# Patient Record
Sex: MALE | Race: BLACK OR AFRICAN AMERICAN | NOT HISPANIC OR LATINO | Employment: OTHER | URBAN - METROPOLITAN AREA
[De-identification: names, ages, dates, MRNs, and addresses within clinical notes are randomized per-mention and may not be internally consistent; named-entity substitution may affect disease eponyms.]

---

## 2023-08-29 PROBLEM — M25.561 CHRONIC PAIN OF BOTH KNEES: Status: ACTIVE | Noted: 2023-08-29

## 2023-08-29 PROBLEM — M25.562 CHRONIC PAIN OF BOTH KNEES: Status: ACTIVE | Noted: 2023-08-29

## 2023-08-29 PROBLEM — G89.29 CHRONIC PAIN OF BOTH KNEES: Status: ACTIVE | Noted: 2023-08-29

## 2023-08-29 PROBLEM — M17.0 DEGENERATIVE ARTHRITIS OF KNEE, BILATERAL: Status: ACTIVE | Noted: 2023-08-29

## 2023-08-29 RX ORDER — IBUPROFEN 800 MG/1
TABLET ORAL
COMMUNITY
Start: 2022-05-25

## 2023-08-29 RX ORDER — IBUPROFEN 200 MG
TABLET ORAL
COMMUNITY
End: 2023-10-31

## 2023-08-29 RX ORDER — SIMVASTATIN 40 MG/1
1 TABLET, FILM COATED ORAL NIGHTLY
COMMUNITY
Start: 2020-12-23

## 2023-08-29 RX ORDER — GLIPIZIDE 5 MG/1
1 TABLET ORAL
COMMUNITY
Start: 2021-05-20

## 2023-08-29 RX ORDER — LIDOCAINE 50 MG/G
1 PATCH TOPICAL DAILY
COMMUNITY

## 2023-08-29 RX ORDER — ASPIRIN 325 MG
50000 TABLET, DELAYED RELEASE (ENTERIC COATED) ORAL
COMMUNITY
Start: 2023-08-14 | End: 2023-10-31

## 2023-08-29 RX ORDER — DICLOFENAC SODIUM 50 MG/1
1 TABLET, DELAYED RELEASE ORAL 3 TIMES DAILY
COMMUNITY
Start: 2021-09-16

## 2023-08-29 RX ORDER — ATORVASTATIN CALCIUM 10 MG/1
1 TABLET, FILM COATED ORAL DAILY
COMMUNITY
Start: 2022-02-01

## 2023-08-29 RX ORDER — TRAMADOL HYDROCHLORIDE 50 MG/1
TABLET ORAL
COMMUNITY

## 2023-08-29 RX ORDER — LIDOCAINE 50 MG/G
PATCH TOPICAL
COMMUNITY
Start: 2021-01-22

## 2023-08-29 RX ORDER — METFORMIN HYDROCHLORIDE 500 MG/1
500 TABLET ORAL 2 TIMES DAILY
COMMUNITY
Start: 2023-05-10 | End: 2023-11-06

## 2023-08-29 RX ORDER — ALBUTEROL SULFATE 90 UG/1
AEROSOL, METERED RESPIRATORY (INHALATION)
COMMUNITY
Start: 2021-06-09

## 2023-08-29 RX ORDER — AMLODIPINE BESYLATE 10 MG/1
1 TABLET ORAL DAILY
COMMUNITY
Start: 2021-10-20

## 2023-08-29 RX ORDER — LOSARTAN POTASSIUM 100 MG/1
1 TABLET ORAL DAILY
COMMUNITY
Start: 2021-10-20

## 2023-08-29 RX ORDER — LIDOCAINE 50 MG/G
OINTMENT TOPICAL
COMMUNITY
Start: 2021-01-22

## 2023-10-20 ENCOUNTER — TRANSCRIBE ORDERS (OUTPATIENT)
Dept: INFUSION THERAPY | Facility: CLINIC | Age: 73
End: 2023-10-20
Payer: MEDICARE

## 2023-10-20 DIAGNOSIS — M25.562 BILATERAL CHRONIC KNEE PAIN: ICD-10-CM

## 2023-10-20 DIAGNOSIS — G89.29 BILATERAL CHRONIC KNEE PAIN: ICD-10-CM

## 2023-10-20 DIAGNOSIS — M25.561 BILATERAL CHRONIC KNEE PAIN: ICD-10-CM

## 2023-10-31 ENCOUNTER — HOSPITAL ENCOUNTER (OUTPATIENT)
Dept: PAIN MEDICINE | Facility: CLINIC | Age: 73
Discharge: HOME | End: 2023-10-31
Payer: MEDICARE

## 2023-10-31 ENCOUNTER — APPOINTMENT (OUTPATIENT)
Dept: PAIN MEDICINE | Facility: CLINIC | Age: 73
End: 2023-10-31
Payer: MEDICARE

## 2023-10-31 ENCOUNTER — ANCILLARY PROCEDURE (OUTPATIENT)
Dept: RADIOLOGY | Facility: CLINIC | Age: 73
End: 2023-10-31
Payer: MEDICARE

## 2023-10-31 VITALS
DIASTOLIC BLOOD PRESSURE: 78 MMHG | HEART RATE: 68 BPM | OXYGEN SATURATION: 96 % | RESPIRATION RATE: 16 BRPM | TEMPERATURE: 96.3 F | SYSTOLIC BLOOD PRESSURE: 139 MMHG

## 2023-10-31 DIAGNOSIS — M25.562 BILATERAL CHRONIC KNEE PAIN: ICD-10-CM

## 2023-10-31 DIAGNOSIS — M25.561 BILATERAL CHRONIC KNEE PAIN: ICD-10-CM

## 2023-10-31 DIAGNOSIS — G89.29 BILATERAL CHRONIC KNEE PAIN: ICD-10-CM

## 2023-10-31 PROCEDURE — 7100000010 HC PHASE TWO TIME - EACH INCREMENTAL 1 MINUTE

## 2023-10-31 PROCEDURE — 7100000009 HC PHASE TWO TIME - INITIAL BASE CHARGE

## 2023-10-31 PROCEDURE — 2500000005 HC RX 250 GENERAL PHARMACY W/O HCPCS

## 2023-10-31 PROCEDURE — 20611 DRAIN/INJ JOINT/BURSA W/US: CPT | Performed by: ANESTHESIOLOGY

## 2023-10-31 PROCEDURE — 77003 FLUOROGUIDE FOR SPINE INJECT: CPT

## 2023-10-31 PROCEDURE — 20611 DRAIN/INJ JOINT/BURSA W/US: CPT | Mod: 50 | Performed by: ANESTHESIOLOGY

## 2023-10-31 PROCEDURE — 2500000004 HC RX 250 GENERAL PHARMACY W/ HCPCS (ALT 636 FOR OP/ED): Mod: JZ | Performed by: ANESTHESIOLOGY

## 2023-10-31 RX ORDER — BUPIVACAINE HYDROCHLORIDE 7.5 MG/ML
INJECTION, SOLUTION EPIDURAL; RETROBULBAR
Status: COMPLETED
Start: 2023-10-31 | End: 2023-10-31

## 2023-10-31 RX ORDER — LIDOCAINE HYDROCHLORIDE 5 MG/ML
INJECTION, SOLUTION INFILTRATION; INTRAVENOUS
Status: COMPLETED
Start: 2023-10-31 | End: 2023-10-31

## 2023-10-31 RX ADMIN — BUPIVACAINE HYDROCHLORIDE 75 MG: 7.5 INJECTION, SOLUTION EPIDURAL; RETROBULBAR at 14:15

## 2023-10-31 RX ADMIN — LIDOCAINE HYDROCHLORIDE 250 MG: 5 INJECTION, SOLUTION INFILTRATION at 14:15

## 2023-10-31 RX ADMIN — TRIAMCINOLONE ACETONIDE EXTENDED-RELEASE INJECTABLE SUSPENSION 32 MG: KIT INTRA-ARTICULAR at 15:11

## 2023-10-31 RX ADMIN — TRIAMCINOLONE ACETONIDE EXTENDED-RELEASE INJECTABLE SUSPENSION 32 MG: KIT INTRA-ARTICULAR at 15:12

## 2023-10-31 ASSESSMENT — COLUMBIA-SUICIDE SEVERITY RATING SCALE - C-SSRS
1. IN THE PAST MONTH, HAVE YOU WISHED YOU WERE DEAD OR WISHED YOU COULD GO TO SLEEP AND NOT WAKE UP?: NO
6. HAVE YOU EVER DONE ANYTHING, STARTED TO DO ANYTHING, OR PREPARED TO DO ANYTHING TO END YOUR LIFE?: NO

## 2023-10-31 ASSESSMENT — ENCOUNTER SYMPTOMS
EYES NEGATIVE: 1
CARDIOVASCULAR NEGATIVE: 1
RESPIRATORY NEGATIVE: 1
GASTROINTESTINAL NEGATIVE: 1
HEMATOLOGIC/LYMPHATIC NEGATIVE: 1
ENDOCRINE NEGATIVE: 1
ARTHRALGIAS: 1
PSYCHIATRIC NEGATIVE: 1

## 2023-10-31 ASSESSMENT — PAIN - FUNCTIONAL ASSESSMENT: PAIN_FUNCTIONAL_ASSESSMENT: 0-10

## 2023-10-31 ASSESSMENT — PAIN SCALES - GENERAL: PAINLEVEL_OUTOF10: 8

## 2023-10-31 NOTE — Clinical Note
Prepped with ChloraPrep, a minimum of 3 minute dry time, longer if needed, no pooling noted, patient draped in sterile fashion. Bilateral knee

## 2023-10-31 NOTE — OP NOTE
Pre-op diagnosis: Knee arthritis  Postoperative diagnosis: The same    Procedure: bilateral Zilretta 32 mg,   injection under fluoroscopy guidance.  Blood loss: 0 mL  Complications: None    Ready to learn, no apparent learning barriers.  Explained treatment plan. Pt. verbalized understanding. Following review of potential side effects and complications (including but not necessarily limited to infection, allergic reaction, local tissue breakdown, injury to bodily tissues. The patient expressed understanding of this risk and wishes to proceed with the elective procedure.    The patient was brought to the operating room and placed in the supine position with pillows underneath the right knees..The procedure was carried out under sterile prep with Chlora-prep.  Under fluoroscopy guidance the skin was infiltrated with lidocaine 1% using 25 ga 1.5 inche  needle and size 18-gauge 1.5 inch needle was introduced and advanced into the lateral/caudal area of the patella into the ligament then the knee joint and after negative aspiration 0.5 cc of Omnipaque 300 was injected and showed excellent distribution of the dye into the joint cavity.  Next Zilretta 32 mg + 2 cc of 0.75 bupivacaine were injected into the joint.  The needle was flushed and removed.     The same procedure technique and medication dosages were repeated at  the left knee.    Patient tolerated the procedure very well and discharged to the recovery room in stable condition.

## 2023-10-31 NOTE — H&P
History Of Present Illness  Roly Cruz is a 73 y.o. male presenting with bilateral knee arthritis.  The patient has no changes in health medical management or allergy since his last visit on 9/19/2023.     Past Medical History  No past medical history on file.    Surgical History  No past surgical history on file.     Social History  He has no history on file for tobacco use, alcohol use, and drug use.    Family History  Family History   Family history unknown: Yes        Allergies  Patient has no known allergies.    Review of Systems   HENT: Negative.     Eyes: Negative.    Respiratory: Negative.     Cardiovascular: Negative.    Gastrointestinal: Negative.    Endocrine: Negative.    Genitourinary: Negative.    Musculoskeletal:  Positive for arthralgias.   Skin: Negative.    Hematological: Negative.    Psychiatric/Behavioral: Negative.          Physical Exam  Vitals and nursing note reviewed.   Constitutional:       Appearance: Normal appearance.   HENT:      Head: Normocephalic and atraumatic.      Nose: Nose normal.   Eyes:      Extraocular Movements: Extraocular movements intact.      Conjunctiva/sclera: Conjunctivae normal.      Pupils: Pupils are equal, round, and reactive to light.   Cardiovascular:      Rate and Rhythm: Normal rate and regular rhythm.      Pulses: Normal pulses.      Heart sounds: Normal heart sounds.   Pulmonary:      Effort: Pulmonary effort is normal.      Breath sounds: Normal breath sounds.   Abdominal:      General: Abdomen is flat. Bowel sounds are normal.      Palpations: Abdomen is soft.   Musculoskeletal:         General: Tenderness present.   Skin:     General: Skin is warm.   Neurological:      General: No focal deficit present.      Mental Status: He is alert and oriented to person, place, and time.   Psychiatric:         Mood and Affect: Mood normal.         Behavior: Behavior normal.          Last Recorded Vitals  Blood pressure 139/78, pulse 68, temperature 35.7 °C (96.3  °F), resp. rate 16, SpO2 96 %.    Relevant Results             Assessment/Plan   Active Problems:  There are no active Hospital Problems.      Plan for bilateral knee Zilretta injection           Deven Vines MD

## 2023-11-25 ENCOUNTER — HOSPITAL ENCOUNTER (EMERGENCY)
Facility: HOSPITAL | Age: 73
Discharge: HOME | End: 2023-11-25
Payer: MEDICARE

## 2023-11-25 ENCOUNTER — APPOINTMENT (OUTPATIENT)
Dept: RADIOLOGY | Facility: HOSPITAL | Age: 73
End: 2023-11-25
Payer: MEDICARE

## 2023-11-25 VITALS
BODY MASS INDEX: 36.53 KG/M2 | DIASTOLIC BLOOD PRESSURE: 83 MMHG | SYSTOLIC BLOOD PRESSURE: 129 MMHG | HEIGHT: 76 IN | HEART RATE: 73 BPM | TEMPERATURE: 98.6 F | OXYGEN SATURATION: 99 % | WEIGHT: 300 LBS | RESPIRATION RATE: 19 BRPM

## 2023-11-25 DIAGNOSIS — J11.1 FLU: Primary | ICD-10-CM

## 2023-11-25 LAB
ALBUMIN SERPL BCP-MCNC: 4 G/DL (ref 3.4–5)
ALP SERPL-CCNC: 48 U/L (ref 33–136)
ALT SERPL W P-5'-P-CCNC: 30 U/L (ref 10–52)
ANION GAP SERPL CALC-SCNC: 11 MMOL/L (ref 10–20)
AST SERPL W P-5'-P-CCNC: 30 U/L (ref 9–39)
BASOPHILS # BLD AUTO: 0.01 X10*3/UL (ref 0–0.1)
BASOPHILS NFR BLD AUTO: 0.2 %
BILIRUB SERPL-MCNC: 0.4 MG/DL (ref 0–1.2)
BNP SERPL-MCNC: 13 PG/ML (ref 0–99)
BUN SERPL-MCNC: 19 MG/DL (ref 6–23)
CALCIUM SERPL-MCNC: 8.7 MG/DL (ref 8.6–10.3)
CARDIAC TROPONIN I PNL SERPL HS: 18 NG/L (ref 0–20)
CHLORIDE SERPL-SCNC: 103 MMOL/L (ref 98–107)
CO2 SERPL-SCNC: 27 MMOL/L (ref 21–32)
CREAT SERPL-MCNC: 1.07 MG/DL (ref 0.5–1.3)
EOSINOPHIL # BLD AUTO: 0.09 X10*3/UL (ref 0–0.4)
EOSINOPHIL NFR BLD AUTO: 2 %
ERYTHROCYTE [DISTWIDTH] IN BLOOD BY AUTOMATED COUNT: 12.6 % (ref 11.5–14.5)
FLUAV RNA RESP QL NAA+PROBE: DETECTED
FLUBV RNA RESP QL NAA+PROBE: NOT DETECTED
GFR SERPL CREATININE-BSD FRML MDRD: 74 ML/MIN/1.73M*2
GLUCOSE SERPL-MCNC: 159 MG/DL (ref 74–99)
HCT VFR BLD AUTO: 42.4 % (ref 41–52)
HGB BLD-MCNC: 14.4 G/DL (ref 13.5–17.5)
IMM GRANULOCYTES # BLD AUTO: 0.01 X10*3/UL (ref 0–0.5)
IMM GRANULOCYTES NFR BLD AUTO: 0.2 % (ref 0–0.9)
INR PPP: 1.1 (ref 0.9–1.1)
LACTATE SERPL-SCNC: 0.9 MMOL/L (ref 0.4–2)
LYMPHOCYTES # BLD AUTO: 1.7 X10*3/UL (ref 0.8–3)
LYMPHOCYTES NFR BLD AUTO: 37.2 %
MAGNESIUM SERPL-MCNC: 1.93 MG/DL (ref 1.6–2.4)
MCH RBC QN AUTO: 30.1 PG (ref 26–34)
MCHC RBC AUTO-ENTMCNC: 34 G/DL (ref 32–36)
MCV RBC AUTO: 89 FL (ref 80–100)
MONOCYTES # BLD AUTO: 0.4 X10*3/UL (ref 0.05–0.8)
MONOCYTES NFR BLD AUTO: 8.8 %
NEUTROPHILS # BLD AUTO: 2.36 X10*3/UL (ref 1.6–5.5)
NEUTROPHILS NFR BLD AUTO: 51.6 %
NRBC BLD-RTO: 0 /100 WBCS (ref 0–0)
PLATELET # BLD AUTO: 143 X10*3/UL (ref 150–450)
POTASSIUM SERPL-SCNC: 3.5 MMOL/L (ref 3.5–5.3)
PROT SERPL-MCNC: 7 G/DL (ref 6.4–8.2)
PROTHROMBIN TIME: 11.9 SECONDS (ref 9.8–12.8)
RBC # BLD AUTO: 4.79 X10*6/UL (ref 4.5–5.9)
SARS-COV-2 RNA RESP QL NAA+PROBE: NOT DETECTED
SODIUM SERPL-SCNC: 137 MMOL/L (ref 136–145)
WBC # BLD AUTO: 4.6 X10*3/UL (ref 4.4–11.3)

## 2023-11-25 PROCEDURE — 85025 COMPLETE CBC W/AUTO DIFF WBC: CPT | Performed by: HEALTH CARE PROVIDER

## 2023-11-25 PROCEDURE — 85610 PROTHROMBIN TIME: CPT | Performed by: HEALTH CARE PROVIDER

## 2023-11-25 PROCEDURE — 96374 THER/PROPH/DIAG INJ IV PUSH: CPT

## 2023-11-25 PROCEDURE — 80053 COMPREHEN METABOLIC PANEL: CPT | Performed by: HEALTH CARE PROVIDER

## 2023-11-25 PROCEDURE — 99284 EMERGENCY DEPT VISIT MOD MDM: CPT | Mod: 25

## 2023-11-25 PROCEDURE — 87636 SARSCOV2 & INF A&B AMP PRB: CPT | Performed by: HEALTH CARE PROVIDER

## 2023-11-25 PROCEDURE — 84484 ASSAY OF TROPONIN QUANT: CPT | Performed by: HEALTH CARE PROVIDER

## 2023-11-25 PROCEDURE — 83880 ASSAY OF NATRIURETIC PEPTIDE: CPT | Performed by: HEALTH CARE PROVIDER

## 2023-11-25 PROCEDURE — 71045 X-RAY EXAM CHEST 1 VIEW: CPT | Performed by: RADIOLOGY

## 2023-11-25 PROCEDURE — 87635 SARS-COV-2 COVID-19 AMP PRB: CPT | Performed by: HEALTH CARE PROVIDER

## 2023-11-25 PROCEDURE — 2500000004 HC RX 250 GENERAL PHARMACY W/ HCPCS (ALT 636 FOR OP/ED): Performed by: HEALTH CARE PROVIDER

## 2023-11-25 PROCEDURE — 83605 ASSAY OF LACTIC ACID: CPT | Performed by: HEALTH CARE PROVIDER

## 2023-11-25 PROCEDURE — 36415 COLL VENOUS BLD VENIPUNCTURE: CPT | Performed by: HEALTH CARE PROVIDER

## 2023-11-25 PROCEDURE — 96361 HYDRATE IV INFUSION ADD-ON: CPT

## 2023-11-25 PROCEDURE — 83735 ASSAY OF MAGNESIUM: CPT | Performed by: HEALTH CARE PROVIDER

## 2023-11-25 PROCEDURE — 71045 X-RAY EXAM CHEST 1 VIEW: CPT

## 2023-11-25 RX ORDER — ONDANSETRON 4 MG/1
8 TABLET, ORALLY DISINTEGRATING ORAL EVERY 12 HOURS PRN
Qty: 8 TABLET | Refills: 0 | Status: SHIPPED | OUTPATIENT
Start: 2023-11-25 | End: 2023-11-27

## 2023-11-25 RX ORDER — ONDANSETRON HYDROCHLORIDE 2 MG/ML
4 INJECTION, SOLUTION INTRAVENOUS ONCE
Status: COMPLETED | OUTPATIENT
Start: 2023-11-25 | End: 2023-11-25

## 2023-11-25 RX ADMIN — ONDANSETRON 4 MG: 2 INJECTION INTRAMUSCULAR; INTRAVENOUS at 18:00

## 2023-11-25 RX ADMIN — SODIUM CHLORIDE, POTASSIUM CHLORIDE, SODIUM LACTATE AND CALCIUM CHLORIDE 500 ML: 600; 310; 30; 20 INJECTION, SOLUTION INTRAVENOUS at 18:00

## 2023-11-25 ASSESSMENT — LIFESTYLE VARIABLES
EVER HAD A DRINK FIRST THING IN THE MORNING TO STEADY YOUR NERVES TO GET RID OF A HANGOVER: NO
HAVE YOU EVER FELT YOU SHOULD CUT DOWN ON YOUR DRINKING: NO
EVER FELT BAD OR GUILTY ABOUT YOUR DRINKING: NO
HAVE PEOPLE ANNOYED YOU BY CRITICIZING YOUR DRINKING: NO

## 2023-11-25 ASSESSMENT — COLUMBIA-SUICIDE SEVERITY RATING SCALE - C-SSRS
1. IN THE PAST MONTH, HAVE YOU WISHED YOU WERE DEAD OR WISHED YOU COULD GO TO SLEEP AND NOT WAKE UP?: NO
1. IN THE PAST MONTH, HAVE YOU WISHED YOU WERE DEAD OR WISHED YOU COULD GO TO SLEEP AND NOT WAKE UP?: NO
6. HAVE YOU EVER DONE ANYTHING, STARTED TO DO ANYTHING, OR PREPARED TO DO ANYTHING TO END YOUR LIFE?: NO
2. HAVE YOU ACTUALLY HAD ANY THOUGHTS OF KILLING YOURSELF?: NO
6. HAVE YOU EVER DONE ANYTHING, STARTED TO DO ANYTHING, OR PREPARED TO DO ANYTHING TO END YOUR LIFE?: NO
2. HAVE YOU ACTUALLY HAD ANY THOUGHTS OF KILLING YOURSELF?: NO

## 2023-11-25 ASSESSMENT — PAIN - FUNCTIONAL ASSESSMENT: PAIN_FUNCTIONAL_ASSESSMENT: 0-10

## 2023-11-25 ASSESSMENT — PAIN SCALES - GENERAL: PAINLEVEL_OUTOF10: 6

## 2023-11-25 ASSESSMENT — PAIN DESCRIPTION - PROGRESSION: CLINICAL_PROGRESSION: NOT CHANGED

## 2023-11-25 NOTE — ED TRIAGE NOTES
Pt states cough, chest tightness, sore throat for one week. Pt with nausea, vomiting, and diarrhea. Pt states diffuse abdominal pain as well. Pt ambulatory in triage, states hx of HTN.

## 2023-11-25 NOTE — ED PROVIDER NOTES
HPI   Chief Complaint   Patient presents with   • Abdominal Pain   • Flu Symptoms       CC: Generalized flu symptoms, nausea vomiting diarrhea  HPI:   73-year-old male with history of non-insulin-dependent type 2 diabetes hypertension hyperlipidemia presents to ED complaining of nausea vomiting diarrhea for 3 to 4 days associated with some subjective fever, chills, generalized body aches, generalized weakness, denies having any hemoptysis, hematemesis, hematochezia or melanotic stools does not take any long-term anticoagulant or antiplatelet medications.  He denies any recent sick contacts.    Additional Limitations to History:   External Records Reviewed: I reviewed recent and relevant outside records including   History Obtained From:     Past Medical History: Per HPI  Medications: Reviewed in EMR and with patient  Allergies:  Reviewed in EMR  Past Surgical History:   Social History:     ------------------------------------------------------------------------------------------------------  Physical Exam:  --Vital signs reviewed in nursing triage note, EMR flow sheets, and at patient's bedside  GEN:  A&Ox3, no acute distress, appears comfortable.  Conversational and appropriate.  No confusion or gross mental status changes.  EYES: EOMI, non-injected sclera.  ENT: Moist mucous membranes, no apparent injuries or lesions.   CARDIO: Normal rate and regular rhythm. No murmurs, rubs, or gallops.  2+ equal pulses of the distal extremities.   PULM: Clear to auscultation bilaterally. No rales, rhonchi, or wheezes. Good symmetric chest expansion.  GI: Soft, non-tender, non-distended. No rebound tenderness or guarding.  SKIN: Warm and dry, no rashes or lesions.  MSK: ROM intact the extremities without contractures.   EXT: No peripheral edema, contusions, or wounds.   NEURO: Cranial nerves II-XII grossly intact. Sensation to light touch intact and equal bilaterally in upper and lower extremities.  Symmetric 5/5 strength in  upper and lower extremities.  PSYCH: Appropriate mood and behavior, converses and responds appropriately during exam.  -------------------------------------------------------------------------------------------------------        Differential Diagnoses Considered:   Chronic Medical Conditions Significantly Affecting Care:   Diagnostic testing considered: [PERC, D-Dimer, PECARN, etc.]    - EKG interpreted by myself (ED attending physician):   - I independently interpreted: [CXR, CT, POCUS, etc. including your interpretation]  - Labs notable for     Escalation of Care: Appropriate for   Social Determinants of Health Significantly Affecting Care: [Homelessness, lacking transportation, uninsured, unable to afford medications]  Prescription Drug Consideration: [Antibiotics, antivirals, pain medications, etc.]  Discussion of Management with Other Providers:  I discussed the patient/results with: [admitting team, consultant, radiologist, social work, EPAT, case management, PT/OT, RT, PCP, etc.]      Alonzo Tran PA-C                          No data recorded                Patient History   No past medical history on file.  No past surgical history on file.  Family History   Family history unknown: Yes     Social History     Tobacco Use   • Smoking status: Not on file   • Smokeless tobacco: Not on file   Substance Use Topics   • Alcohol use: Not on file   • Drug use: Not on file       Physical Exam   ED Triage Vitals   Temp Pulse Resp BP   -- -- -- --      SpO2 Temp src Heart Rate Source Patient Position   -- -- -- --      BP Location FiO2 (%)     -- --       Physical Exam    ED Course & MDM   Diagnoses as of 11/25/23 1848   Flu       Medical Decision Making  73-year-old male with nausea vomiting diarrhea, generalized bodyaches, in the setting of positive influenza, no evidence suggesting viral meningitis or viral pneumonia, and patient is afebrile, nontoxic, well-hydrated in no acute distress given IV fluid  hydration,        Procedure  Procedures     Alonzo Tran PA-C  11/25/23 8693

## 2023-11-30 ENCOUNTER — HOSPITAL ENCOUNTER (EMERGENCY)
Facility: HOSPITAL | Age: 73
Discharge: HOME | End: 2023-11-30
Payer: MEDICARE

## 2023-11-30 ENCOUNTER — APPOINTMENT (OUTPATIENT)
Dept: RADIOLOGY | Facility: HOSPITAL | Age: 73
End: 2023-11-30
Payer: MEDICARE

## 2023-11-30 VITALS
HEART RATE: 65 BPM | DIASTOLIC BLOOD PRESSURE: 78 MMHG | SYSTOLIC BLOOD PRESSURE: 152 MMHG | BODY MASS INDEX: 36.17 KG/M2 | WEIGHT: 297 LBS | HEIGHT: 76 IN | RESPIRATION RATE: 15 BRPM | OXYGEN SATURATION: 99 % | TEMPERATURE: 97.8 F

## 2023-11-30 DIAGNOSIS — J10.1 INFLUENZA A: Primary | ICD-10-CM

## 2023-11-30 PROCEDURE — 71046 X-RAY EXAM CHEST 2 VIEWS: CPT | Mod: FOREIGN READ | Performed by: RADIOLOGY

## 2023-11-30 PROCEDURE — 71046 X-RAY EXAM CHEST 2 VIEWS: CPT | Mod: FR

## 2023-11-30 PROCEDURE — 2500000001 HC RX 250 WO HCPCS SELF ADMINISTERED DRUGS (ALT 637 FOR MEDICARE OP): Performed by: PHYSICIAN ASSISTANT

## 2023-11-30 PROCEDURE — 99283 EMERGENCY DEPT VISIT LOW MDM: CPT

## 2023-11-30 PROCEDURE — 2500000002 HC RX 250 W HCPCS SELF ADMINISTERED DRUGS (ALT 637 FOR MEDICARE OP, ALT 636 FOR OP/ED): Performed by: PHYSICIAN ASSISTANT

## 2023-11-30 PROCEDURE — 2500000004 HC RX 250 GENERAL PHARMACY W/ HCPCS (ALT 636 FOR OP/ED): Performed by: PHYSICIAN ASSISTANT

## 2023-11-30 RX ORDER — ALBUTEROL SULFATE 90 UG/1
2 AEROSOL, METERED RESPIRATORY (INHALATION) EVERY 4 HOURS PRN
Qty: 18 G | Refills: 0 | Status: SHIPPED | OUTPATIENT
Start: 2023-11-30 | End: 2023-12-30

## 2023-11-30 RX ORDER — IPRATROPIUM BROMIDE AND ALBUTEROL SULFATE 2.5; .5 MG/3ML; MG/3ML
3 SOLUTION RESPIRATORY (INHALATION) ONCE
Status: COMPLETED | OUTPATIENT
Start: 2023-11-30 | End: 2023-11-30

## 2023-11-30 RX ORDER — GUAIFENESIN 100 MG/5ML
100-200 SOLUTION ORAL EVERY 4 HOURS PRN
Qty: 120 ML | Refills: 0 | Status: SHIPPED | OUTPATIENT
Start: 2023-11-30 | End: 2023-12-30

## 2023-11-30 RX ORDER — GUAIFENESIN/DEXTROMETHORPHAN 100-10MG/5
5 SYRUP ORAL ONCE
Status: COMPLETED | OUTPATIENT
Start: 2023-11-30 | End: 2023-11-30

## 2023-11-30 RX ORDER — PREDNISONE 50 MG/1
50 TABLET ORAL DAILY
Qty: 5 TABLET | Refills: 0 | Status: SHIPPED | OUTPATIENT
Start: 2023-11-30 | End: 2023-12-05

## 2023-11-30 RX ADMIN — IPRATROPIUM BROMIDE AND ALBUTEROL SULFATE 3 ML: 2.5; .5 SOLUTION RESPIRATORY (INHALATION) at 12:16

## 2023-11-30 RX ADMIN — PREDNISONE 50 MG: 20 TABLET ORAL at 12:15

## 2023-11-30 RX ADMIN — GUAIFENESIN AND DEXTROMETHORPHAN 5 ML: 100; 10 SYRUP ORAL at 12:16

## 2023-11-30 ASSESSMENT — COLUMBIA-SUICIDE SEVERITY RATING SCALE - C-SSRS
6. HAVE YOU EVER DONE ANYTHING, STARTED TO DO ANYTHING, OR PREPARED TO DO ANYTHING TO END YOUR LIFE?: NO
1. IN THE PAST MONTH, HAVE YOU WISHED YOU WERE DEAD OR WISHED YOU COULD GO TO SLEEP AND NOT WAKE UP?: NO
2. HAVE YOU ACTUALLY HAD ANY THOUGHTS OF KILLING YOURSELF?: NO

## 2023-11-30 ASSESSMENT — PAIN - FUNCTIONAL ASSESSMENT: PAIN_FUNCTIONAL_ASSESSMENT: 0-10

## 2023-11-30 ASSESSMENT — PAIN SCALES - GENERAL: PAINLEVEL_OUTOF10: 0 - NO PAIN

## 2023-11-30 NOTE — ED TRIAGE NOTES
PT TO ED FROM HOME WITH CONTINUED COUGH CONGESTION FOR 1 WEEK. PT WAS SWABBED FOR COVID IN [place] AND RESULTED NEGATIVE. PT IN NO ACUTE DISTRESS.

## 2023-11-30 NOTE — ED PROVIDER NOTES
"This is a 73-year-old male with past medical history of diabetes, hypertension, hyperlipidemia who presents to the ED with a dry cough for the past 1 week.  5 days ago he was seen at [facility] and was diagnosed with influenza A.  He states that he feels as if his symptoms are persisting.  He states that he has been having cough pills at home as well as OTC Robitussin however his cough is persisting.  He states that he feels as if he has been wheezing and endorses shortness of breath with coughing but denies any shortness of breath at this time.  He endorses subjective fevers and chills, generalized malaise, and sweating at nighttime.  He denies any current chest pain, nausea, vomiting, or diarrhea.  He does not that he had nausea, vomiting, and diarrhea earlier this week, however it has since resolved.  Additionally patient is endorsing nasal congestion and rhinorrhea.  He denies his cough worsening, and states that he is mostly concerned that it has persisted.      History provided by:  Patient   used: No             Visit Vitals  /65   Pulse 77   Temp 36.6 °C (97.8 °F)   Resp 15   Ht 1.93 m (6' 4\")   Wt 135 kg (297 lb)   SpO2 98%   BMI 36.15 kg/m²   Smoking Status Never Assessed   BSA 2.69 m²          Physical Exam     Physical exam:   General: Vitals noted, no distress. Afebrile.   EENT:  Hearing grossly intact. Normal phonation. MMM. Airway patient. PERRL. EOMI.   Neck: No midline tenderness or paraspinal tenderness. FROM.   Cardiac: Regular, rate, rhythm. Normal S1 and S2.  No murmurs, gallops, rubs.   Pulmonary: Good air exchange. Lungs clear bilaterally.  Minimal end expiratory wheezing bilaterally. no accessory muscle use.   Abdomen: Soft, nonsurgical. Nontender. No peritoneal signs. Normoactive bowel sounds.   Back: No CVA tenderness. No midline tenderness or paraspinal tenderness. No obvious deformity.   Extremities: No peripheral edema.  Full range of motion. Moves all extremities " freely. No tenderness throughout extremities.   Skin: No rash. Warm and Dry.   Neuro: No focal neurologic deficits. CN 2-12 grossly intact. Sensation equal bilaterally. No weakness.         Labs Reviewed - No data to display    XR chest 2 views   Final Result   No acute cardiopulmonary abnormality.   Signed by David Rutherford MD            ED Course & MDM     Medical Decision Making  This is a 73-year-old male with past medical history of hypertension, diabetes, hyperlipidemia who presents to the ED with persistent cough as well as nasal congestion and wheezing in the setting of known influenza a.  Vitals grossly unremarkable upon arrival to the ED, specifically SpO2 was at 98% on room air.  On examination patient was able to speak in full sentences without difficulty.  No increased work of breathing.  He did have minimal end expiratory wheezing bilaterally.  Patient's records from [facility] was reviewed which did show he was positive for flu A.  Laboratory studies have been obtained earlier this week which were all grossly unremarkable.  Chest x-ray had been performed at that time which was also grossly unremarkable.  Due to patient's persistent symptoms repeat chest x-ray ordered.  He was medicated with prednisone, 1 DuoNeb treatment as well as Robitussin DM for his symptoms.  On my reevaluation he was feeling improved.  Wheezing had resolved.  Chest x-ray showed no acute cardiopulmonary process.  I discussed with the patient his symptoms are likely due to his noted influenza A virus.  Patient felt comfortable being discharged at this time.  He was given a prescription for Robitussin, prednisone, as well as a new inhaler at home.  Patient was told that the prednisone is likely to cause transient elevations in his blood glucose levels that he should keep a close eye on this.  He was advised to follow close with his primary care provider.  He was discharged from the emergency department in stable condition.  He had no  further questions at this time.    Amount and/or Complexity of Data Reviewed  Radiology: ordered and independent interpretation performed.     Details: No visualized consolidation or pneumothorax    Risk  Prescription drug management.         Diagnoses as of 11/30/23 1337   Influenza A       Procedures    ANNALISA Harris, PA-C     Jenna Collado PA-C  11/30/23 1337

## 2023-12-22 ENCOUNTER — HOSPITAL ENCOUNTER (OUTPATIENT)
Dept: CARDIOLOGY | Facility: HOSPITAL | Age: 73
Discharge: HOME | End: 2023-12-22
Payer: MEDICARE

## 2023-12-22 PROCEDURE — 93005 ELECTROCARDIOGRAM TRACING: CPT

## 2023-12-30 LAB
ATRIAL RATE: 77 BPM
P AXIS: 54 DEGREES
P OFFSET: 206 MS
P ONSET: 149 MS
PR INTERVAL: 154 MS
Q ONSET: 226 MS
QRS COUNT: 13 BEATS
QRS DURATION: 152 MS
QT INTERVAL: 402 MS
QTC CALCULATION(BAZETT): 454 MS
QTC FREDERICIA: 436 MS
R AXIS: -39 DEGREES
T AXIS: 21 DEGREES
T OFFSET: 427 MS
VENTRICULAR RATE: 77 BPM

## 2024-02-15 NOTE — PROGRESS NOTES
SUBJECTIVE:  This is 73 y.o.  male with PMH of hypertension and obesity BMI of 36 in addition to osteoarthritis of multiple sites worse bilateral knee with decreased response to regular steroid injections.  Patient received Zilretta injection on 10/31/2023 who is here for follow-up ***      Prior office visit:  9/19/2023: This is 73 year year old male recall past medical history of hypertension and obesity BMI of 36 in addition to osteoarthritis of multiple sites worse bilateral knee with the last injections was on 4/4/2023 who is here for follow-up stating that his steroid injections in the office lasting him only for a months of good pain relief but not beyond that. We will plan on bilateral Zilretta injection. And in case this 1 will fail we may consider bilateral genicular nerve block otherwise he is to go for total knee arthroplasty.        Last procedure:  10/31/2023 bilateral knee Zilretta injection patient has had ***% improvement in pain and function  4/4/2023 bilateral knee steroid injection, patient has had 80% improvement in pain and function  9/15/2022 bilateral knee steroid injection the patient has had a 80% improvement in pain and function  3/4/2022 bilateral knee intra-articular steroid injection the patient has had a 80% improvement in pain and function its not lasting long enough like used to.  9/16/2021 bilateral intra-articular knee injection (office) 90% improvement in pain and function  1/22/2021 bilateral knee intra-articular injection (office) the patient has had a 90% improvement in pain 90% functional improvement.  A year ago bilateral knee injection the patient has had a 100% improvement in pain.     Portions of record reviewed for pertinent issues: active problem list, medication list, allergies, family history, social history, notes from last encounter, encounters, lab results, imaging and other system records.     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      I have personally reviewed the OARRS report for this patient. This report is scanned into the electronic medical record. I have considered the risks of abuse, dependence, addiction and diversion. It showed: ***  OPIOID RISK ASSESSMENT SCORE ***/26  Opioid agreement: ***  Activities of daily living: ***  Adverse effects: ***  Analgesia: W/O: ***/10, W ***/10    Toxicology screen: ***  Aberrant behavior: ***        Review of Systems     Physical Exam                 Plan  At least 50% of the visit was involved in the discussion of the options for treatment. We discussed exercises, medication, interventional therapies and surgery. Healthy life style is essential with patient hard work to achieve the wellness. In addition; discussion with the patient and/or family about any of the diagnostic results, impressions and/or recommended diagnostic studies, prognosis, risks and benefits of treatment options, instructions for treatment and/or follow-up, importance of compliance with chosen treatment options, risk-factor reduction, and patient/family education.         Pool therapy, walking in the pool, at least 3x per week for 30 minutes  Continue self-directed physical therapy  *** Smoking cessation  Healthy lifestyle and anti-inflammatory diet in addition to weight control discussed with the  patient  Alternative chronic pain therapies was discussed, encouraged and information was handed  Return to Clinic ***     *Please note this report has been produced using speech recognition software and may contain errors related to that system including grammar, punctuation and spelling as well as words and phrases that may be inappropriate. If there are questions or concerns, please feel free to contact me to clarify.    Deven Vines MD

## 2024-02-16 ENCOUNTER — APPOINTMENT (OUTPATIENT)
Dept: PAIN MEDICINE | Facility: CLINIC | Age: 74
End: 2024-02-16
Payer: MEDICARE

## 2024-03-19 NOTE — PROGRESS NOTES
SUBJECTIVE:  This is 73 y.o.  male with PMH of obesity BMI 36, hypertension, diabetes with advanced osteoarthritis of bilateral knees who gets Zilretta injections last 1 was on 10/31/2023 who is here for follow-up ***      Prior office visit:  9/19/2023: This is 73 year year old male recall past medical history of hypertension and obesity BMI of 36 in addition to osteoarthritis of multiple sites worse bilateral knee with the last injections was on 4/4/2023 who is here for follow-up stating that his steroid injections in the office lasting him only for a months of good pain relief but not beyond that. We will plan on bilateral Zilretta injection. And in case this 1 will fail we may consider bilateral genicular nerve block otherwise he is to go for total knee arthroplasty.             Last procedure:  10/31/2023 bilateral knee Zilretta injection patient has had ***% improvement in pain and function  4/4/2023 bilateral knee steroid injection, patient has had 80% improvement in pain and function  9/15/2022 bilateral knee steroid injection the patient has had a 80% improvement in pain and function  3/4/2022 bilateral knee intra-articular steroid injection the patient has had a 80% improvement in pain and function its not lasting long enough like used to.  9/16/2021 bilateral intra-articular knee injection (office) 90% improvement in pain and function  1/22/2021 bilateral knee intra-articular injection (office) the patient has had a 90% improvement in pain 90% functional improvement.  A year ago bilateral knee injection the patient has had a 100% improvement in pain.     Portions of record reviewed for pertinent issues: active problem list, medication list, allergies, family history, social history, notes from last encounter, encounters, lab results, imaging and other system records.     I have personally reviewed the OARRS report for this patient. This report is scanned into the electronic medical record. I have  considered the risks of abuse, dependence, addiction and diversion. 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  Social Hx and education:  to [employer]           Diagnostic studies:  Bilateral knee x-ray showed severe degenerative changes specially over the medial condyle  Patient has blood work done at [facility] showed he has normal BUN/creatinine kidney function all his lab work actually is very good except that he has mildly elevated hemoglobin A1c 6.2 with his blood glucose at that time was 120           Procedures:  *** the patient has had a ***% improvement in pain and function      Portions of record reviewed for pertinent issues: active problem list, medication list, allergies, family history, social history, notes from last encounter, encounters, lab results, imaging and other available records.        OPIOID RISK ASSESSMENT SCORE ***/26  Opioid agreement: ***  Activities of daily living: ***  Adverse effects: ***  Analgesia: W/O: ***/10, W ***/10    Toxicology screen: ***  Aberrant behavior: ***        Review of Systems     Physical Exam                 Plan  At least 50% of the visit was involved in the discussion of the options for treatment. We discussed exercises, medication, interventional therapies and surgery. Healthy life style is essential with patient hard work to achieve the wellness. In addition; discussion with the patient and/or family about any of the diagnostic results, impressions and/or recommended diagnostic studies, prognosis, risks and benefits of treatment options, instructions for treatment and/or follow-up, importance of compliance with chosen treatment options, risk-factor reduction, and patient/family education.         Pool therapy, walking in the pool, at least 3x per week for 30 minutes  Continue self-directed physical therapy  ***  Smoking cessation  Healthy lifestyle and anti-inflammatory diet in addition to weight control discussed with the patient  Alternative chronic pain therapies was discussed, encouraged and information was handed  Return to Clinic ***     *Please note this report has been produced using speech recognition software and may contain errors related to that system including grammar, punctuation and spelling as well as words and phrases that may be inappropriate. If there are questions or concerns, please feel free to contact me to clarify.    Deven Vines MD

## 2024-03-21 ENCOUNTER — APPOINTMENT (OUTPATIENT)
Dept: PAIN MEDICINE | Facility: CLINIC | Age: 74
End: 2024-03-21
Payer: MEDICARE

## 2024-03-22 ENCOUNTER — APPOINTMENT (OUTPATIENT)
Dept: PAIN MEDICINE | Facility: CLINIC | Age: 74
End: 2024-03-22
Payer: MEDICARE

## 2024-03-28 NOTE — H&P (VIEW-ONLY)
SUBJECTIVE:  This is 73 y.o.  male with PMH of obesity BMI 36, diabetes hypertension addition to osteoarthritis of multiple sites worse bilateral knee who received in October of last year bilateral knee Zilretta injections.  The patient recently was admitted to [facility] with E. coli bacteremia and septicemia from UTI treated with antibiotic and developed C. difficile infection treated with vancomycin he is now cleared of his infections who is here for follow-up stating that he is doing very well now everything back to normal.  The patient has responded very well to his direct injection and he is here to have appointment for his injection for bilateral knees.  The last 1 cannot give him 99% improvement in his impairment function for more than 3 months      Prior office visit:  9/19/2023: This is 73 year year old male recall past medical history of hypertension and obesity BMI of 36 in addition to osteoarthritis of multiple sites worse bilateral knee with the last injections was on 4/4/2023 who is here for follow-up stating that his steroid injections in the office lasting him only for a months of good pain relief but not beyond that. We will plan on bilateral Zilretta injection. And in case this 1 will fail we may consider bilateral genicular nerve block otherwise he is to go for total knee arthroplasty.       Last procedure:  10/31/2023 bilateral knee Zilretta injections and has had 99% improvement in pain and function  4/4/2023 bilateral knee steroid injection, patient has had 80% improvement in pain and function  9/15/2022 bilateral knee steroid injection the patient has had a 80% improvement in pain and function  3/4/2022 bilateral knee intra-articular steroid injection the patient has had a 80% improvement in pain and function its not lasting long enough like used to.  9/16/2021 bilateral intra-articular knee injection (office) 90% improvement in pain and function  1/22/2021 bilateral knee intra-articular injection  (office) the patient has had a 90% improvement in pain 90% functional improvement.  A year ago bilateral knee injection the patient has had a 100% improvement in pain.     Portions of record reviewed for pertinent issues: active problem list, medication list, allergies, family history, social history, notes from last encounter, encounters, lab results, imaging and other system records.     I have personally reviewed the OARRS report for this patient. This report is scanned into the electronic medical record. I have considered the risks of abuse, dependence, addiction and diversion. 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     Eyes: Negative.    Respiratory: Negative.     Cardiovascular: Negative.    Gastrointestinal: Negative.    Endocrine: Negative.    Genitourinary: Negative.    Musculoskeletal:  Positive for arthralgias.   Skin: Negative.    Neurological: Negative.    Hematological: Negative.    Psychiatric/Behavioral: Negative.          Physical Exam  Vitals and nursing note reviewed.   Constitutional:       Appearance: Normal appearance.   HENT:      Head: Normocephalic and atraumatic.      Nose: Nose normal.   Eyes:      Extraocular Movements: Extraocular movements intact.      Conjunctiva/sclera: Conjunctivae normal.      Pupils: Pupils are equal, round, and reactive to light.   Cardiovascular:      Rate and Rhythm: Normal rate  and regular rhythm.      Pulses: Normal pulses.      Heart sounds: Normal heart sounds.   Pulmonary:      Effort: Pulmonary effort is normal.      Breath sounds: Normal breath sounds.   Abdominal:      General: Abdomen is flat. Bowel sounds are normal.      Palpations: Abdomen is soft.   Musculoskeletal:         General: Tenderness present.   Skin:     General: Skin is warm.   Neurological:      General: No focal deficit present.      Mental Status: He is alert.   Psychiatric:         Mood and Affect: Mood normal.         Behavior: Behavior normal.                      Plan  At least 50% of the visit was involved in the discussion of the options for treatment. We discussed exercises, medication, interventional therapies and surgery. Healthy life style is essential with patient hard work to achieve the wellness. In addition; discussion with the patient and/or family about any of the diagnostic results, impressions and/or recommended diagnostic studies, prognosis, risks and benefits of treatment options, instructions for treatment and/or follow-up, importance of compliance with chosen treatment options, risk-factor reduction, and patient/family education.         Pool therapy, walking in the pool, at least 3x per week for 30 minutes  Continue self-directed physical therapy  Bilateral knee Zilretta injection orders placed  Healthy lifestyle and anti-inflammatory diet in addition to weight control discussed with the patient  Alternative chronic pain therapies was discussed, encouraged and information was handed  Return to Clinic after the injection     *Please note this report has been produced using speech recognition software and may contain errors related to that system including grammar, punctuation and spelling as well as words and phrases that may be inappropriate. If there are questions or concerns, please feel free to contact me to clarify.    Deven Vines MD

## 2024-03-28 NOTE — PROGRESS NOTES
Last office visit  6/27/2023  Next Appointment  none  Last Refill  7/7/2023    rosuvastatin (CRESTOR) 10 MG tablet 90 tablet 0 7/7/2023     Sig - Route: TAKE 1 TABLET BY MOUTH DAILY. - Oral    Sent to pharmacy as: Rosuvastatin Calcium 10 MG Oral Tablet (CRESTOR)    Class: Eprescribe       SUBJECTIVE:  This is 73 y.o.  male with PMH of obesity BMI 36, diabetes hypertension addition to osteoarthritis of multiple sites worse bilateral knee who received in October of last year bilateral knee Zilretta injections.  The patient recently was admitted to [facility] with E. coli bacteremia and septicemia from UTI treated with antibiotic and developed C. difficile infection treated with vancomycin he is now cleared of his infections who is here for follow-up stating that he is doing very well now everything back to normal.  The patient has responded very well to his direct injection and he is here to have appointment for his injection for bilateral knees.  The last 1 cannot give him 99% improvement in his impairment function for more than 3 months      Prior office visit:  9/19/2023: This is 73 year year old male recall past medical history of hypertension and obesity BMI of 36 in addition to osteoarthritis of multiple sites worse bilateral knee with the last injections was on 4/4/2023 who is here for follow-up stating that his steroid injections in the office lasting him only for a months of good pain relief but not beyond that. We will plan on bilateral Zilretta injection. And in case this 1 will fail we may consider bilateral genicular nerve block otherwise he is to go for total knee arthroplasty.       Last procedure:  10/31/2023 bilateral knee Zilretta injections and has had 99% improvement in pain and function  4/4/2023 bilateral knee steroid injection, patient has had 80% improvement in pain and function  9/15/2022 bilateral knee steroid injection the patient has had a 80% improvement in pain and function  3/4/2022 bilateral knee intra-articular steroid injection the patient has had a 80% improvement in pain and function its not lasting long enough like used to.  9/16/2021 bilateral intra-articular knee injection (office) 90% improvement in pain and function  1/22/2021 bilateral knee intra-articular injection  (office) the patient has had a 90% improvement in pain 90% functional improvement.  A year ago bilateral knee injection the patient has had a 100% improvement in pain.     Portions of record reviewed for pertinent issues: active problem list, medication list, allergies, family history, social history, notes from last encounter, encounters, lab results, imaging and other system records.     I have personally reviewed the OARRS report for this patient. This report is scanned into the electronic medical record. I have considered the risks of abuse, dependence, addiction and diversion. 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     Eyes: Negative.    Respiratory: Negative.     Cardiovascular: Negative.    Gastrointestinal: Negative.    Endocrine: Negative.    Genitourinary: Negative.    Musculoskeletal:  Positive for arthralgias.   Skin: Negative.    Neurological: Negative.    Hematological: Negative.    Psychiatric/Behavioral: Negative.          Physical Exam  Vitals and nursing note reviewed.   Constitutional:       Appearance: Normal appearance.   HENT:      Head: Normocephalic and atraumatic.      Nose: Nose normal.   Eyes:      Extraocular Movements: Extraocular movements intact.      Conjunctiva/sclera: Conjunctivae normal.      Pupils: Pupils are equal, round, and reactive to light.   Cardiovascular:      Rate and Rhythm: Normal rate  and regular rhythm.      Pulses: Normal pulses.      Heart sounds: Normal heart sounds.   Pulmonary:      Effort: Pulmonary effort is normal.      Breath sounds: Normal breath sounds.   Abdominal:      General: Abdomen is flat. Bowel sounds are normal.      Palpations: Abdomen is soft.   Musculoskeletal:         General: Tenderness present.   Skin:     General: Skin is warm.   Neurological:      General: No focal deficit present.      Mental Status: He is alert.   Psychiatric:         Mood and Affect: Mood normal.         Behavior: Behavior normal.                      Plan  At least 50% of the visit was involved in the discussion of the options for treatment. We discussed exercises, medication, interventional therapies and surgery. Healthy life style is essential with patient hard work to achieve the wellness. In addition; discussion with the patient and/or family about any of the diagnostic results, impressions and/or recommended diagnostic studies, prognosis, risks and benefits of treatment options, instructions for treatment and/or follow-up, importance of compliance with chosen treatment options, risk-factor reduction, and patient/family education.         Pool therapy, walking in the pool, at least 3x per week for 30 minutes  Continue self-directed physical therapy  Bilateral knee Zilretta injection orders placed  Healthy lifestyle and anti-inflammatory diet in addition to weight control discussed with the patient  Alternative chronic pain therapies was discussed, encouraged and information was handed  Return to Clinic after the injection     *Please note this report has been produced using speech recognition software and may contain errors related to that system including grammar, punctuation and spelling as well as words and phrases that may be inappropriate. If there are questions or concerns, please feel free to contact me to clarify.    Deven Vines MD

## 2024-03-29 ENCOUNTER — OFFICE VISIT (OUTPATIENT)
Dept: PAIN MEDICINE | Facility: CLINIC | Age: 74
End: 2024-03-29
Payer: MEDICARE

## 2024-03-29 VITALS
HEIGHT: 76 IN | TEMPERATURE: 97.5 F | OXYGEN SATURATION: 97 % | BODY MASS INDEX: 36.17 KG/M2 | HEART RATE: 79 BPM | RESPIRATION RATE: 18 BRPM | WEIGHT: 297 LBS | SYSTOLIC BLOOD PRESSURE: 151 MMHG | DIASTOLIC BLOOD PRESSURE: 83 MMHG

## 2024-03-29 DIAGNOSIS — M17.0 PRIMARY OSTEOARTHRITIS OF BOTH KNEES: Primary | ICD-10-CM

## 2024-03-29 PROCEDURE — 1036F TOBACCO NON-USER: CPT | Performed by: ANESTHESIOLOGY

## 2024-03-29 PROCEDURE — 99214 OFFICE O/P EST MOD 30 MIN: CPT | Performed by: ANESTHESIOLOGY

## 2024-03-29 PROCEDURE — 1125F AMNT PAIN NOTED PAIN PRSNT: CPT | Performed by: ANESTHESIOLOGY

## 2024-03-29 SDOH — ECONOMIC STABILITY: FOOD INSECURITY: WITHIN THE PAST 12 MONTHS, THE FOOD YOU BOUGHT JUST DIDN'T LAST AND YOU DIDN'T HAVE MONEY TO GET MORE.: NEVER TRUE

## 2024-03-29 SDOH — ECONOMIC STABILITY: FOOD INSECURITY: WITHIN THE PAST 12 MONTHS, YOU WORRIED THAT YOUR FOOD WOULD RUN OUT BEFORE YOU GOT MONEY TO BUY MORE.: NEVER TRUE

## 2024-03-29 ASSESSMENT — PAIN DESCRIPTION - DESCRIPTORS: DESCRIPTORS: ACHING;BURNING;THROBBING

## 2024-03-29 ASSESSMENT — PATIENT HEALTH QUESTIONNAIRE - PHQ9
SUM OF ALL RESPONSES TO PHQ9 QUESTIONS 1 AND 2: 0
1. LITTLE INTEREST OR PLEASURE IN DOING THINGS: NOT AT ALL
2. FEELING DOWN, DEPRESSED OR HOPELESS: NOT AT ALL

## 2024-03-29 ASSESSMENT — ENCOUNTER SYMPTOMS
NEUROLOGICAL NEGATIVE: 1
OCCASIONAL FEELINGS OF UNSTEADINESS: 0
ENDOCRINE NEGATIVE: 1
LOSS OF SENSATION IN FEET: 0
GASTROINTESTINAL NEGATIVE: 1
EYES NEGATIVE: 1
CARDIOVASCULAR NEGATIVE: 1
PSYCHIATRIC NEGATIVE: 1
ARTHRALGIAS: 1
DEPRESSION: 0
HEMATOLOGIC/LYMPHATIC NEGATIVE: 1
RESPIRATORY NEGATIVE: 1

## 2024-03-29 ASSESSMENT — COLUMBIA-SUICIDE SEVERITY RATING SCALE - C-SSRS
1. IN THE PAST MONTH, HAVE YOU WISHED YOU WERE DEAD OR WISHED YOU COULD GO TO SLEEP AND NOT WAKE UP?: NO
6. HAVE YOU EVER DONE ANYTHING, STARTED TO DO ANYTHING, OR PREPARED TO DO ANYTHING TO END YOUR LIFE?: NO
2. HAVE YOU ACTUALLY HAD ANY THOUGHTS OF KILLING YOURSELF?: NO

## 2024-03-29 ASSESSMENT — LIFESTYLE VARIABLES
HOW OFTEN DO YOU HAVE SIX OR MORE DRINKS ON ONE OCCASION: NEVER
AUDIT-C TOTAL SCORE: 0
TOTAL SCORE: 0
SKIP TO QUESTIONS 9-10: 1
HOW MANY STANDARD DRINKS CONTAINING ALCOHOL DO YOU HAVE ON A TYPICAL DAY: PATIENT DOES NOT DRINK
HOW OFTEN DO YOU HAVE A DRINK CONTAINING ALCOHOL: NEVER

## 2024-03-29 ASSESSMENT — PAIN - FUNCTIONAL ASSESSMENT: PAIN_FUNCTIONAL_ASSESSMENT: 0-10

## 2024-03-29 ASSESSMENT — PAIN SCALES - GENERAL
PAINLEVEL_OUTOF10: 9
PAINLEVEL: 9

## 2024-03-29 NOTE — PROGRESS NOTES
This is 73 y.o.  male with who has been treated for  bilateral knee pain . Pain is 99% better after having the Zilerttaa injection. The pain return one or two weeks ago .The pain is described as achiness and throbbing and burning .  and is relieved by Medications biofreeze  . Here for follow-up .  Chief Complaint   Patient presents with    Follow-up     10/31/2023 bilateral knee Zilretta injections     Here to discuss having Zilertta injections again   Pain Therapies: Injections      Opioid Risk Assessment Score 0/26

## 2024-04-15 ENCOUNTER — APPOINTMENT (OUTPATIENT)
Dept: RADIOLOGY | Facility: CLINIC | Age: 74
End: 2024-04-15
Payer: MEDICARE

## 2024-04-15 ENCOUNTER — APPOINTMENT (OUTPATIENT)
Dept: PAIN MEDICINE | Facility: CLINIC | Age: 74
End: 2024-04-15
Payer: MEDICARE

## 2024-04-16 DIAGNOSIS — M17.0 PRIMARY OSTEOARTHRITIS OF BOTH KNEES: Primary | ICD-10-CM

## 2024-04-22 ENCOUNTER — HOSPITAL ENCOUNTER (OUTPATIENT)
Dept: RADIOLOGY | Facility: CLINIC | Age: 74
Discharge: HOME | End: 2024-04-22
Payer: MEDICARE

## 2024-04-22 ENCOUNTER — HOSPITAL ENCOUNTER (OUTPATIENT)
Dept: PAIN MEDICINE | Facility: CLINIC | Age: 74
Discharge: HOME | End: 2024-04-22
Payer: MEDICARE

## 2024-04-22 VITALS
TEMPERATURE: 97.2 F | SYSTOLIC BLOOD PRESSURE: 143 MMHG | HEART RATE: 65 BPM | RESPIRATION RATE: 16 BRPM | WEIGHT: 297 LBS | HEIGHT: 76 IN | DIASTOLIC BLOOD PRESSURE: 81 MMHG | BODY MASS INDEX: 36.17 KG/M2 | OXYGEN SATURATION: 98 %

## 2024-04-22 DIAGNOSIS — M17.0 PRIMARY OSTEOARTHRITIS OF BOTH KNEES: ICD-10-CM

## 2024-04-22 PROCEDURE — 20611 DRAIN/INJ JOINT/BURSA W/US: CPT | Performed by: ANESTHESIOLOGY

## 2024-04-22 PROCEDURE — 20611 DRAIN/INJ JOINT/BURSA W/US: CPT | Mod: 50 | Performed by: ANESTHESIOLOGY

## 2024-04-22 PROCEDURE — 2500000005 HC RX 250 GENERAL PHARMACY W/O HCPCS

## 2024-04-22 PROCEDURE — 2500000004 HC RX 250 GENERAL PHARMACY W/ HCPCS (ALT 636 FOR OP/ED): Mod: JZ | Performed by: ANESTHESIOLOGY

## 2024-04-22 RX ORDER — BUPIVACAINE HYDROCHLORIDE 7.5 MG/ML
INJECTION, SOLUTION EPIDURAL; RETROBULBAR
Status: COMPLETED
Start: 2024-04-22 | End: 2024-04-22

## 2024-04-22 RX ORDER — LIDOCAINE HYDROCHLORIDE 5 MG/ML
INJECTION, SOLUTION INFILTRATION; INTRAVENOUS
Status: COMPLETED
Start: 2024-04-22 | End: 2024-04-22

## 2024-04-22 RX ADMIN — LIDOCAINE HYDROCHLORIDE 250 MG: 5 INJECTION, SOLUTION INFILTRATION at 10:28

## 2024-04-22 RX ADMIN — TRIAMCINOLONE ACETONIDE EXTENDED-RELEASE INJECTABLE SUSPENSION 32 MG: KIT INTRA-ARTICULAR at 10:28

## 2024-04-22 RX ADMIN — TRIAMCINOLONE ACETONIDE EXTENDED-RELEASE INJECTABLE SUSPENSION 32 MG: KIT INTRA-ARTICULAR at 10:31

## 2024-04-22 RX ADMIN — BUPIVACAINE HYDROCHLORIDE 75 MG: 7.5 INJECTION, SOLUTION EPIDURAL; RETROBULBAR at 10:28

## 2024-04-22 ASSESSMENT — COLUMBIA-SUICIDE SEVERITY RATING SCALE - C-SSRS
2. HAVE YOU ACTUALLY HAD ANY THOUGHTS OF KILLING YOURSELF?: NO
6. HAVE YOU EVER DONE ANYTHING, STARTED TO DO ANYTHING, OR PREPARED TO DO ANYTHING TO END YOUR LIFE?: NO
1. IN THE PAST MONTH, HAVE YOU WISHED YOU WERE DEAD OR WISHED YOU COULD GO TO SLEEP AND NOT WAKE UP?: NO

## 2024-04-22 ASSESSMENT — ENCOUNTER SYMPTOMS
DEPRESSION: 0
LOSS OF SENSATION IN FEET: 0
OCCASIONAL FEELINGS OF UNSTEADINESS: 1

## 2024-04-22 ASSESSMENT — PAIN SCALES - GENERAL
PAINLEVEL_OUTOF10: 10 - WORST POSSIBLE PAIN
PAINLEVEL_OUTOF10: 9

## 2024-04-22 ASSESSMENT — PAIN - FUNCTIONAL ASSESSMENT: PAIN_FUNCTIONAL_ASSESSMENT: 0-10

## 2024-04-22 NOTE — OP NOTE
Pre-op diagnosis: Knee arthritis  Postoperative diagnosis: The same    Procedure: bilateral knee Zilretta 32 mg,   injection under fluoroscopy guidance.  Blood loss: 0 mL  Complications: None    Ready to learn, no apparent learning barriers.  Explained treatment plan. Pt. verbalized understanding. Following review of potential side effects and complications (including but not necessarily limited to infection, allergic reaction, local tissue breakdown, injury to bodily tissues. The patient expressed understanding of this risk and wishes to proceed with the elective procedure.    The patient was brought to the operating room and placed in the supine position with pillows underneath the bilateral knees..The procedure was carried out under sterile prep with Chlora-prep.  Under fluoroscopy guidance the skin was infiltrated with lidocaine 0.5% using 25 ga 1.5 inche  needle and size 18-gauge 1.5 inch needle was introduced and advanced into the lateral/caudal area of the patella into the ligament then the knee joint and after negative aspiration 0.5 cc of Omnipaque 300 was injected and showed excellent distribution of the dye into the joint cavity.  Next Zilretta 32 mg + 2 cc of 0.75 bupivacaine were injected into the joint.  The needle was flushed and removed.     The same procedure technique and medication dosages were repeated at  the left knee.    Patient tolerated the procedure very well and discharged to the recovery room in stable condition.

## 2024-09-05 NOTE — PROGRESS NOTES
SUBJECTIVE:  This is 74 y.o.  male with PMH of obesity BMI 36, diabetes hypertension addition to osteoarthritis of multiple sites worse bilateral knee that usually responds to Zilretta injection for 3 months.  The patient now is going to consider TKA and he goes to the [facility] for that.  I recommended for him to make an appointment and start the process since he cannot have replacement for 3 months after my injections.  Will plan on scheduling him for bilateral knee Zilretta injection under fluoroscopy guidance soon.    Prior office visit:  3/29/2024: This is 73 y.o.  male with PMH of obesity BMI 36, diabetes hypertension addition to osteoarthritis of multiple sites worse bilateral knee who received in October of last year bilateral knee Zilretta injections.  The patient recently was admitted to [facility] with E. coli bacteremia and septicemia from UTI treated with antibiotic and developed C. difficile infection treated with vancomycin he is now cleared of his infections who is here for follow-up stating that he is doing very well now everything back to normal.  The patient has responded very well to his direct injection and he is here to have appointment for his injection for bilateral knees.  The last 1 cannot give him 99% improvement in his impairment function for more than 3 months     Last procedure:  4/22/2020 for bilateral knee Zilretta injection patient has had 90% improvement in pain and function  10/31/2023 bilateral knee Zilretta injections and has had 99% improvement in pain and function  4/4/2023 bilateral knee steroid injection, patient has had 80% improvement in pain and function  9/15/2022 bilateral knee steroid injection the patient has had a 80% improvement in pain and function  3/4/2022 bilateral knee intra-articular steroid injection the patient has had a 80% improvement in pain and function its not lasting long enough like used to.  9/16/2021 bilateral intra-articular knee injection (office)  90% improvement in pain and function  1/22/2021 bilateral knee intra-articular injection (office) the patient has had a 90% improvement in pain 90% functional improvement.  A year ago bilateral knee injection the patient has had a 100% improvement in pain.     Portions of record reviewed for pertinent issues: active problem list, medication list, allergies, family history, social history, notes from last encounter, encounters, lab results, imaging and other system records.     I have personally reviewed the OARRS report for this patient. This report is scanned into the electronic medical record. I have considered the risks of abuse, dependence, addiction and diversion. 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     Eyes: Negative.    Respiratory: Negative.     Cardiovascular: Negative.    Gastrointestinal: Negative.    Endocrine: Negative.    Genitourinary: Negative.    Musculoskeletal:  Positive for arthralgias.   Skin: Negative.    Neurological: Negative.    Hematological: Negative.    Psychiatric/Behavioral: Negative.           Physical Exam  Vitals and nursing note reviewed.   Constitutional:       Appearance: Normal appearance.   HENT:      Head: Normocephalic and atraumatic.      Nose: Nose normal.   Eyes:      Extraocular Movements: Extraocular movements intact.      Conjunctiva/sclera: Conjunctivae normal.      Pupils: Pupils  are equal, round, and reactive to light.   Cardiovascular:      Rate and Rhythm: Normal rate and regular rhythm.      Pulses: Normal pulses.      Heart sounds: Normal heart sounds.   Pulmonary:      Effort: Pulmonary effort is normal.      Breath sounds: Normal breath sounds.   Abdominal:      General: Abdomen is flat. Bowel sounds are normal.      Palpations: Abdomen is soft.   Musculoskeletal:         General: Tenderness present.   Skin:     General: Skin is warm.   Neurological:      General: No focal deficit present.      Mental Status: He is alert.   Psychiatric:         Mood and Affect: Mood normal.         Behavior: Behavior normal.           Plan  At least 50% of the visit was involved in the discussion of the options for treatment. We discussed exercises, medication, interventional therapies and surgery. Healthy life style is essential with patient hard work to achieve the wellness. In addition; discussion with the patient and/or family about any of the diagnostic results, impressions and/or recommended diagnostic studies, prognosis, risks and benefits of treatment options, instructions for treatment and/or follow-up, importance of compliance with chosen treatment options, risk-factor reduction, and patient/family education.         Recommended Pool therapy, walking in the pool, at least 3x per week for 30 minutes  Continue self-directed physical therapy with at least daily exercises for minimum of 20-minute, brochure was handed to the patient  Bilateral knee Zilretta injection under fluoroscopy guidance  Trial of tramadol to take with acetaminophen at 1000 mg and ibuprofen 600 mg 3 times daily as needed  Healthy lifestyle and anti-inflammatory diet in addition to weight control discussed with the patient  Alternative chronic pain therapies was discussed, encouraged and information was handed  Return to Clinic after the injection     *Please note this report has been produced using speech recognition  software and may contain errors related to that system including grammar, punctuation and spelling as well as words and phrases that may be inappropriate. If there are questions or concerns, please feel free to contact me to clarify.    Deven Vines MD

## 2024-09-05 NOTE — H&P (VIEW-ONLY)
SUBJECTIVE:  This is 74 y.o.  male with PMH of obesity BMI 36, diabetes hypertension addition to osteoarthritis of multiple sites worse bilateral knee that usually responds to Zilretta injection for 3 months.  The patient now is going to consider TKA and he goes to the [facility] for that.  I recommended for him to make an appointment and start the process since he cannot have replacement for 3 months after my injections.  Will plan on scheduling him for bilateral knee Zilretta injection under fluoroscopy guidance soon.    Prior office visit:  3/29/2024: This is 73 y.o.  male with PMH of obesity BMI 36, diabetes hypertension addition to osteoarthritis of multiple sites worse bilateral knee who received in October of last year bilateral knee Zilretta injections.  The patient recently was admitted to [facility] with E. coli bacteremia and septicemia from UTI treated with antibiotic and developed C. difficile infection treated with vancomycin he is now cleared of his infections who is here for follow-up stating that he is doing very well now everything back to normal.  The patient has responded very well to his direct injection and he is here to have appointment for his injection for bilateral knees.  The last 1 cannot give him 99% improvement in his impairment function for more than 3 months     Last procedure:  4/22/2020 for bilateral knee Zilretta injection patient has had 90% improvement in pain and function  10/31/2023 bilateral knee Zilretta injections and has had 99% improvement in pain and function  4/4/2023 bilateral knee steroid injection, patient has had 80% improvement in pain and function  9/15/2022 bilateral knee steroid injection the patient has had a 80% improvement in pain and function  3/4/2022 bilateral knee intra-articular steroid injection the patient has had a 80% improvement in pain and function its not lasting long enough like used to.  9/16/2021 bilateral intra-articular knee injection (office)  90% improvement in pain and function  1/22/2021 bilateral knee intra-articular injection (office) the patient has had a 90% improvement in pain 90% functional improvement.  A year ago bilateral knee injection the patient has had a 100% improvement in pain.     Portions of record reviewed for pertinent issues: active problem list, medication list, allergies, family history, social history, notes from last encounter, encounters, lab results, imaging and other system records.     I have personally reviewed the [facility] report for this patient. This report is scanned into the electronic medical record. I have considered the risks of abuse, dependence, addiction and diversion. 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     Eyes: Negative.    Respiratory: Negative.     Cardiovascular: Negative.    Gastrointestinal: Negative.    Endocrine: Negative.    Genitourinary: Negative.    Musculoskeletal:  Positive for arthralgias.   Skin: Negative.    Neurological: Negative.    Hematological: Negative.    Psychiatric/Behavioral: Negative.           Physical Exam  Vitals and nursing note reviewed.   Constitutional:       Appearance: Normal appearance.   HENT:      Head: Normocephalic and atraumatic.      Nose: Nose normal.   Eyes:      Extraocular Movements: Extraocular movements intact.      Conjunctiva/sclera: Conjunctivae normal.      Pupils: Pupils  are equal, round, and reactive to light.   Cardiovascular:      Rate and Rhythm: Normal rate and regular rhythm.      Pulses: Normal pulses.      Heart sounds: Normal heart sounds.   Pulmonary:      Effort: Pulmonary effort is normal.      Breath sounds: Normal breath sounds.   Abdominal:      General: Abdomen is flat. Bowel sounds are normal.      Palpations: Abdomen is soft.   Musculoskeletal:         General: Tenderness present.   Skin:     General: Skin is warm.   Neurological:      General: No focal deficit present.      Mental Status: He is alert.   Psychiatric:         Mood and Affect: Mood normal.         Behavior: Behavior normal.           Plan  At least 50% of the visit was involved in the discussion of the options for treatment. We discussed exercises, medication, interventional therapies and surgery. Healthy life style is essential with patient hard work to achieve the wellness. In addition; discussion with the patient and/or family about any of the diagnostic results, impressions and/or recommended diagnostic studies, prognosis, risks and benefits of treatment options, instructions for treatment and/or follow-up, importance of compliance with chosen treatment options, risk-factor reduction, and patient/family education.         Recommended Pool therapy, walking in the pool, at least 3x per week for 30 minutes  Continue self-directed physical therapy with at least daily exercises for minimum of 20-minute, brochure was handed to the patient  Bilateral knee Zilretta injection under fluoroscopy guidance  Trial of tramadol to take with acetaminophen at 1000 mg and ibuprofen 600 mg 3 times daily as needed  Healthy lifestyle and anti-inflammatory diet in addition to weight control discussed with the patient  Alternative chronic pain therapies was discussed, encouraged and information was handed  Return to Clinic after the injection     *Please note this report has been produced using speech recognition  software and may contain errors related to that system including grammar, punctuation and spelling as well as words and phrases that may be inappropriate. If there are questions or concerns, please feel free to contact me to clarify.    Deven Vines MD

## 2024-09-06 ENCOUNTER — OFFICE VISIT (OUTPATIENT)
Dept: PAIN MEDICINE | Facility: CLINIC | Age: 74
End: 2024-09-06
Payer: MEDICARE

## 2024-09-06 VITALS
HEART RATE: 64 BPM | TEMPERATURE: 96.8 F | OXYGEN SATURATION: 98 % | WEIGHT: 297 LBS | SYSTOLIC BLOOD PRESSURE: 151 MMHG | DIASTOLIC BLOOD PRESSURE: 74 MMHG | RESPIRATION RATE: 18 BRPM | HEIGHT: 76 IN | BODY MASS INDEX: 36.17 KG/M2

## 2024-09-06 DIAGNOSIS — M17.0 PRIMARY OSTEOARTHRITIS OF BOTH KNEES: Primary | ICD-10-CM

## 2024-09-06 PROCEDURE — 99214 OFFICE O/P EST MOD 30 MIN: CPT | Performed by: ANESTHESIOLOGY

## 2024-09-06 PROCEDURE — 3008F BODY MASS INDEX DOCD: CPT | Performed by: ANESTHESIOLOGY

## 2024-09-06 PROCEDURE — 1159F MED LIST DOCD IN RCRD: CPT | Performed by: ANESTHESIOLOGY

## 2024-09-06 RX ORDER — IBUPROFEN 600 MG/1
TABLET ORAL
Qty: 90 TABLET | Refills: 11 | Status: SHIPPED | OUTPATIENT
Start: 2024-09-06

## 2024-09-06 RX ORDER — TRAMADOL HYDROCHLORIDE 50 MG/1
50 TABLET ORAL EVERY 8 HOURS PRN
Qty: 21 TABLET | Refills: 0 | Status: SHIPPED | OUTPATIENT
Start: 2024-09-06 | End: 2024-09-13

## 2024-09-06 ASSESSMENT — PAIN SCALES - GENERAL
PAINLEVEL_OUTOF10: 10 - WORST POSSIBLE PAIN
PAINLEVEL: 10-WORST PAIN EVER

## 2024-09-06 ASSESSMENT — ENCOUNTER SYMPTOMS
DEPRESSION: 0
LOSS OF SENSATION IN FEET: 0
OCCASIONAL FEELINGS OF UNSTEADINESS: 0

## 2024-09-06 ASSESSMENT — PAIN - FUNCTIONAL ASSESSMENT: PAIN_FUNCTIONAL_ASSESSMENT: 0-10

## 2024-09-06 ASSESSMENT — PAIN DESCRIPTION - DESCRIPTORS: DESCRIPTORS: ACHING;THROBBING

## 2024-09-06 NOTE — PROGRESS NOTES
This is 74 y.o.  male with who has been treated for  knee pain . Pain was 50 % better, lasting 3 months . The Pain is Back .The pain is described as achiness and throbbing worst in left knee  and is relieved by nothing . Here for follow-up.     Chief Complaint   Patient presents with    Follow-up     Bilateral knee pain left knee is worse       Pain Therapies: Injections last knee injection  4/22/2024

## 2024-09-23 ENCOUNTER — HOSPITAL ENCOUNTER (OUTPATIENT)
Dept: PAIN MEDICINE | Facility: CLINIC | Age: 74
Discharge: HOME | End: 2024-09-23
Payer: MEDICARE

## 2024-09-23 VITALS
TEMPERATURE: 97.7 F | HEIGHT: 76 IN | BODY MASS INDEX: 36.17 KG/M2 | HEART RATE: 71 BPM | OXYGEN SATURATION: 99 % | WEIGHT: 297 LBS | RESPIRATION RATE: 16 BRPM | SYSTOLIC BLOOD PRESSURE: 206 MMHG | DIASTOLIC BLOOD PRESSURE: 91 MMHG

## 2024-09-23 DIAGNOSIS — M17.0 PRIMARY OSTEOARTHRITIS OF BOTH KNEES: ICD-10-CM

## 2024-09-23 PROCEDURE — 77002 NEEDLE LOCALIZATION BY XRAY: CPT | Performed by: ANESTHESIOLOGY

## 2024-09-23 PROCEDURE — 20610 DRAIN/INJ JOINT/BURSA W/O US: CPT | Performed by: ANESTHESIOLOGY

## 2024-09-23 PROCEDURE — 2500000004 HC RX 250 GENERAL PHARMACY W/ HCPCS (ALT 636 FOR OP/ED): Mod: JZ | Performed by: ANESTHESIOLOGY

## 2024-09-23 PROCEDURE — 20610 DRAIN/INJ JOINT/BURSA W/O US: CPT | Mod: 50 | Performed by: ANESTHESIOLOGY

## 2024-09-23 PROCEDURE — 2550000001 HC RX 255 CONTRASTS: Performed by: ANESTHESIOLOGY

## 2024-09-23 PROCEDURE — 2500000005 HC RX 250 GENERAL PHARMACY W/O HCPCS: Performed by: ANESTHESIOLOGY

## 2024-09-23 RX ORDER — LIDOCAINE HYDROCHLORIDE 5 MG/ML
INJECTION, SOLUTION INFILTRATION; INTRAVENOUS AS NEEDED
Status: COMPLETED | OUTPATIENT
Start: 2024-09-23 | End: 2024-09-23

## 2024-09-23 RX ORDER — BUPIVACAINE HYDROCHLORIDE 7.5 MG/ML
INJECTION, SOLUTION EPIDURAL; RETROBULBAR AS NEEDED
Status: COMPLETED | OUTPATIENT
Start: 2024-09-23 | End: 2024-09-23

## 2024-09-23 ASSESSMENT — ENCOUNTER SYMPTOMS
LOSS OF SENSATION IN FEET: 0
DEPRESSION: 0
OCCASIONAL FEELINGS OF UNSTEADINESS: 1

## 2024-09-23 ASSESSMENT — PAIN SCALES - GENERAL
PAINLEVEL_OUTOF10: 10 - WORST POSSIBLE PAIN
PAINLEVEL_OUTOF10: 7

## 2024-09-23 ASSESSMENT — PAIN - FUNCTIONAL ASSESSMENT: PAIN_FUNCTIONAL_ASSESSMENT: 0-10

## 2024-09-23 ASSESSMENT — PAIN DESCRIPTION - DESCRIPTORS: DESCRIPTORS: ACHING

## 2024-09-23 NOTE — Clinical Note
Patient in room , Patient identified . Universal protocal , and preprocedure check list ,patient postioned , site prepped .bilateral knees.

## 2024-11-04 ENCOUNTER — APPOINTMENT (OUTPATIENT)
Dept: PAIN MEDICINE | Facility: CLINIC | Age: 74
End: 2024-11-04
Payer: MEDICARE

## 2024-11-08 ENCOUNTER — APPOINTMENT (OUTPATIENT)
Dept: PAIN MEDICINE | Facility: CLINIC | Age: 74
End: 2024-11-08
Payer: MEDICARE